# Patient Record
Sex: MALE | Race: BLACK OR AFRICAN AMERICAN | Employment: FULL TIME | URBAN - METROPOLITAN AREA
[De-identification: names, ages, dates, MRNs, and addresses within clinical notes are randomized per-mention and may not be internally consistent; named-entity substitution may affect disease eponyms.]

---

## 2017-07-09 ENCOUNTER — HOSPITAL ENCOUNTER (EMERGENCY)
Age: 22
Discharge: OTHER INSTITUTION | End: 2017-07-09
Attending: EMERGENCY MEDICINE
Payer: SELF-PAY

## 2017-07-09 ENCOUNTER — APPOINTMENT (OUTPATIENT)
Dept: CT IMAGING | Age: 22
End: 2017-07-09
Attending: EMERGENCY MEDICINE
Payer: SELF-PAY

## 2017-07-09 VITALS
HEART RATE: 66 BPM | SYSTOLIC BLOOD PRESSURE: 116 MMHG | WEIGHT: 160 LBS | DIASTOLIC BLOOD PRESSURE: 63 MMHG | OXYGEN SATURATION: 100 % | RESPIRATION RATE: 24 BRPM | BODY MASS INDEX: 22.96 KG/M2 | TEMPERATURE: 98.3 F

## 2017-07-09 DIAGNOSIS — S37.30XA URETHRAL INJURY, CLOSED, INITIAL ENCOUNTER: ICD-10-CM
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
## 2017-07-09 LAB
AMORPH CRY URNS QL MICRO: ABNORMAL
ANION GAP BLD CALC-SCNC: 3 MMOL/L (ref 3–18)
APPEARANCE UR: ABNORMAL
BACTERIA URNS QL MICRO: ABNORMAL /HPF
BASOPHILS # BLD AUTO: 0.1 K/UL (ref 0–0.06)
BASOPHILS # BLD: 1 % (ref 0–2)
BILIRUB UR QL: NEGATIVE
BUN SERPL-MCNC: 15 MG/DL (ref 7–18)
BUN/CREAT SERPL: 15 (ref 12–20)
CALCIUM SERPL-MCNC: 8.9 MG/DL (ref 8.5–10.1)
CHLORIDE SERPL-SCNC: 106 MMOL/L (ref 100–108)
CO2 SERPL-SCNC: 33 MMOL/L (ref 21–32)
COLOR UR: YELLOW
CREAT SERPL-MCNC: 1 MG/DL (ref 0.6–1.3)
DIFFERENTIAL METHOD BLD: ABNORMAL
EOSINOPHIL # BLD: 0.2 K/UL (ref 0–0.4)
EOSINOPHIL NFR BLD: 2 % (ref 0–5)
ERYTHROCYTE [DISTWIDTH] IN BLOOD BY AUTOMATED COUNT: 13.1 % (ref 11.6–14.5)
GLUCOSE SERPL-MCNC: 100 MG/DL (ref 74–99)
GLUCOSE UR STRIP.AUTO-MCNC: NEGATIVE MG/DL
HCT VFR BLD AUTO: 42.8 % (ref 36–48)
HGB BLD-MCNC: 14 G/DL (ref 13–16)
HGB UR QL STRIP: NEGATIVE
KETONES UR QL STRIP.AUTO: ABNORMAL MG/DL
LEUKOCYTE ESTERASE UR QL STRIP.AUTO: ABNORMAL
LYMPHOCYTES # BLD AUTO: 31 % (ref 21–52)
LYMPHOCYTES # BLD: 2.3 K/UL (ref 0.9–3.6)
MCH RBC QN AUTO: 28.1 PG (ref 24–34)
MCHC RBC AUTO-ENTMCNC: 32.7 G/DL (ref 31–37)
MCV RBC AUTO: 85.9 FL (ref 74–97)
MONOCYTES # BLD: 0.7 K/UL (ref 0.05–1.2)
MONOCYTES NFR BLD AUTO: 9 % (ref 3–10)
MUCOUS THREADS URNS QL MICRO: ABNORMAL /LPF
NEUTS SEG # BLD: 4.3 K/UL (ref 1.8–8)
NEUTS SEG NFR BLD AUTO: 57 % (ref 40–73)
NITRITE UR QL STRIP.AUTO: NEGATIVE
PH UR STRIP: 7 [PH] (ref 5–8)
PLATELET # BLD AUTO: 213 K/UL (ref 135–420)
PMV BLD AUTO: 10.9 FL (ref 9.2–11.8)
POTASSIUM SERPL-SCNC: 4.7 MMOL/L (ref 3.5–5.5)
PROT UR STRIP-MCNC: ABNORMAL MG/DL
RBC # BLD AUTO: 4.98 M/UL (ref 4.7–5.5)
RBC #/AREA URNS HPF: ABNORMAL /HPF (ref 0–5)
SODIUM SERPL-SCNC: 142 MMOL/L (ref 136–145)
SP GR UR REFRACTOMETRY: 1.03 (ref 1–1.03)
UROBILINOGEN UR QL STRIP.AUTO: 1 EU/DL (ref 0.2–1)
WBC # BLD AUTO: 7.5 K/UL (ref 4.6–13.2)
WBC URNS QL MICRO: ABNORMAL /HPF (ref 0–4)

## 2017-07-09 PROCEDURE — 80048 BASIC METABOLIC PNL TOTAL CA: CPT | Performed by: EMERGENCY MEDICINE

## 2017-07-09 PROCEDURE — 74011250636 HC RX REV CODE- 250/636: Performed by: EMERGENCY MEDICINE

## 2017-07-09 PROCEDURE — 74177 CT ABD & PELVIS W/CONTRAST: CPT

## 2017-07-09 PROCEDURE — 74011636320 HC RX REV CODE- 636/320: Performed by: EMERGENCY MEDICINE

## 2017-07-09 PROCEDURE — 81001 URINALYSIS AUTO W/SCOPE: CPT | Performed by: EMERGENCY MEDICINE

## 2017-07-09 PROCEDURE — 87491 CHLMYD TRACH DNA AMP PROBE: CPT | Performed by: EMERGENCY MEDICINE

## 2017-07-09 PROCEDURE — 96361 HYDRATE IV INFUSION ADD-ON: CPT

## 2017-07-09 PROCEDURE — 85025 COMPLETE CBC W/AUTO DIFF WBC: CPT | Performed by: EMERGENCY MEDICINE

## 2017-07-09 PROCEDURE — 99285 EMERGENCY DEPT VISIT HI MDM: CPT

## 2017-07-09 PROCEDURE — 96374 THER/PROPH/DIAG INJ IV PUSH: CPT

## 2017-07-09 RX ORDER — MORPHINE SULFATE 4 MG/ML
4 INJECTION, SOLUTION INTRAMUSCULAR; INTRAVENOUS
Status: COMPLETED | OUTPATIENT
Start: 2017-07-09 | End: 2017-07-09

## 2017-07-09 RX ADMIN — IOPAMIDOL 100 ML: 612 INJECTION, SOLUTION INTRAVENOUS at 01:28

## 2017-07-09 RX ADMIN — Medication 4 MG: at 00:55

## 2017-07-09 RX ADMIN — SODIUM CHLORIDE 1000 ML: 900 INJECTION, SOLUTION INTRAVENOUS at 00:55

## 2017-07-09 NOTE — ROUTINE PROCESS
TRANSFER - OUT REPORT:    Verbal report given to [id number] [place] [address] RN(name) on Radha Buckley  being transferred to [facility] Emergency Department (unit) for routine progression of care for rule out urethral injury. Report consisted of patients Situation, Background, Assessment and   Recommendations(SBAR). Includes pt hx/allergies/complaint/findings/plan of care. Information from the following report(s) ED Summary was reviewed with the receiving nurse. Lines:   Peripheral IV 07/09/17 Right Antecubital (Active)   Site Assessment Clean, dry, & intact 7/9/2017 12:38 AM   Phlebitis Assessment 0 7/9/2017 12:38 AM   Infiltration Assessment 0 7/9/2017 12:38 AM   Dressing Status Clean, dry, & intact 7/9/2017 12:38 AM   Dressing Type Tape;Transparent 7/9/2017 12:38 AM   Hub Color/Line Status Flushed;Patent 7/9/2017 12:38 AM   Action Taken Blood drawn 7/9/2017 12:38 AM        Opportunity for questions and clarification was provided.       Patient transported with:  [address] [facility] transport staff

## 2017-07-09 NOTE — LETTER
7/14/2017 2:05 PM 
 
Mr. Isaacs Pill [address] [address] Dear Mr. Manzano Miss: The results of your lab work performed in our office were abnormal and we have had difficulty reaching you by telephone. Please contact our office as soon as possible to discuss these results. Sincerely, SIRENA Ferreira
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
## 2017-07-09 NOTE — ED NOTES
Initial trauma assessment on arrival negative (DCAPBTLS negative); reports pain to LUQ/left flank/left posterior lower back. Pain described as sharp. ABD non distended/non tender except with discomfort to LUQ with palpation. Dr Lonnie Louis has assessed pt; orders placed.

## 2017-07-09 NOTE — ED NOTES
Pt asleep; rouses easily to voice/falls asleep easily. Calm/drowsy affect, respirations regular/non labored, skin warm/dry, with no distress noted. Mother instructed to have pt follow up with his PCP, to avoid sports/trauma/heavy lifting or other injury. Instructed to return for worsening pain/hematuria/other concerns.

## 2017-07-09 NOTE — ED NOTES
Verbal and bedside report given to Daria. Pt asleep; rouses easily to voice. Plan of care to transfer for further evaluation to [place] [id number] for urological evaluation explained. Pt affect calm, respirations regular/non labored, skin warm/dry with no bruising/swelling noted to LUQ/left flank/left lower lumbar pain. Per pt pain remains primarily to LUQ; minimal discomfort to general abdomen/left flank/left lower lumbar region. Pt able to void at this time 400 ml urine with no gross hematuria. Attempting to call report to [place] [address].

## 2017-07-09 NOTE — ED NOTES
Plan of care explained to pt and his mother, to include urological/urethral study at [facility]. Pt and mother resting comfortably; mother given recliner and warm blankets. Pt reports feeling improved and resting on his left side with no distress. Respirations regular/non labored with skin war/dry and no worsening pain issues.

## 2017-07-10 LAB
C TRACH RRNA SPEC QL NAA+PROBE: POSITIVE
N GONORRHOEA RRNA SPEC QL NAA+PROBE: NEGATIVE
SPECIMEN SOURCE: ABNORMAL

## 2018-03-20 NOTE — ED NOTES
+ Chlamydia result. Called and left message on number provided for patient to return call. Letter also sent. mother

## 2019-06-05 ENCOUNTER — APPOINTMENT (OUTPATIENT)
Dept: GENERAL RADIOLOGY | Age: 24
End: 2019-06-05
Attending: PHYSICIAN ASSISTANT
Payer: MEDICAID

## 2019-06-05 ENCOUNTER — HOSPITAL ENCOUNTER (EMERGENCY)
Age: 24
Discharge: HOME | End: 2019-06-05
Attending: EMERGENCY MEDICINE
Payer: MEDICAID

## 2019-06-05 VITALS
HEART RATE: 77 BPM | TEMPERATURE: 98.6 F | BODY MASS INDEX: 21.47 KG/M2 | RESPIRATION RATE: 18 BRPM | DIASTOLIC BLOOD PRESSURE: 78 MMHG | HEIGHT: 70 IN | OXYGEN SATURATION: 97 % | SYSTOLIC BLOOD PRESSURE: 118 MMHG | WEIGHT: 150 LBS

## 2019-06-05 DIAGNOSIS — R51.9 NONINTRACTABLE HEADACHE, UNSPECIFIED CHRONICITY PATTERN, UNSPECIFIED HEADACHE TYPE: Primary | ICD-10-CM

## 2019-06-05 PROCEDURE — 99282 EMERGENCY DEPT VISIT SF MDM: CPT

## 2019-06-05 PROCEDURE — 71046 X-RAY EXAM CHEST 2 VIEWS: CPT

## 2019-06-05 PROCEDURE — 74011250637 HC RX REV CODE- 250/637: Performed by: PHYSICIAN ASSISTANT

## 2019-06-05 RX ORDER — ACETAMINOPHEN 325 MG/1
650 TABLET ORAL
Qty: 20 TAB | Refills: 0 | Status: SHIPPED | OUTPATIENT
Start: 2019-06-05 | End: 2019-07-17

## 2019-06-05 RX ORDER — IBUPROFEN 600 MG/1
600 TABLET ORAL
Qty: 20 TAB | Refills: 0 | Status: SHIPPED | OUTPATIENT
Start: 2019-06-05 | End: 2019-07-17

## 2019-06-05 RX ORDER — ACETAMINOPHEN 500 MG
1000 TABLET ORAL
Status: COMPLETED | OUTPATIENT
Start: 2019-06-05 | End: 2019-06-05

## 2019-06-05 RX ADMIN — ACETAMINOPHEN 1000 MG: 500 TABLET ORAL at 11:14

## 2019-06-05 NOTE — DISCHARGE INSTRUCTIONS

## 2019-06-05 NOTE — ED TRIAGE NOTES
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 He is wondering if it's from inhaling smoke. He states headache stays at a constant 4/10 but he has not taken any medication because he does not want to be drowsy.

## 2019-06-05 NOTE — ED NOTES
Frances Bloom is a 27 y.o. male that was discharged in stable condition. The patients diagnosis, condition and treatment were explained to  patient and aftercare instructions were given. The patient verbalized understanding. Patient armband removed and shredded.

## 2019-06-05 NOTE — ED PROVIDER NOTES
EMERGENCY DEPARTMENT HISTORY AND PHYSICAL EXAM    Date: 6/5/2019  Patient Name: Chay Reyes    History of Presenting Illness     Chief Complaint   Patient presents with    Headache         History Provided By: Patient    Chief Complaint: Headache and concern for  overall health of his lungs  Duration: 2 months  Timing: Intermittent  Location: Diffuse head  Quality: Aching  Severity: 4 out of 10  Modifying Factors: Worse when working and not using his protective equipment  Associated Symptoms: none       Additional History (Context): Chay Reyes is a 27 y.o. male with a history of asthma who presents today for 2-week history of headaches and concern for his lung health. Patient states he recently started a job at the [id number] and he states he is exposed to multiple things while there. Patient states he does not like to wear his goggles or his mask is a make him feel claustrophobic. Patient reports he has not tried anything for this at home for fear Tylenol would make him sleepy and he would be unable to do his job. Patient has not been seen by primary care doctor. Patient does not have a history of headaches. PCP: Candido Valles MD    Current Outpatient Medications   Medication Sig Dispense Refill    ibuprofen (MOTRIN) 600 mg tablet Take 1 Tab by mouth every six (6) hours as needed for Pain. 20 Tab 0    acetaminophen (TYLENOL) 325 mg tablet Take 2 Tabs by mouth every four (4) hours as needed for Pain.  20 Tab 0       Past History     Past Medical History:  Past Medical History:   Diagnosis Date    Asthma        Past Surgical History:  Past Surgical History:   Procedure Laterality Date    LAP,INGUINAL HERNIA REPR,INITIAL         Family History:  Family History   Problem Relation Age of Onset    Diabetes Mother     Hypertension Mother     Asthma Mother     Diabetes Maternal Grandmother     Hypertension Maternal Grandmother     Hypertension Maternal Grandfather        Social History:  Social History     Tobacco Use    Smoking status: Never Smoker    Smokeless tobacco: Never Used   Substance Use Topics    Alcohol use: No    Drug use: No       Allergies:  No Known Allergies      Review of Systems   Review of Systems   Constitutional: Negative for chills and fever. HENT: Negative for congestion, rhinorrhea and sore throat. Respiratory: Negative for cough and shortness of breath. Cardiovascular: Negative for chest pain. Gastrointestinal: Negative for abdominal pain, blood in stool, constipation, diarrhea, nausea and vomiting. Genitourinary: Negative for dysuria, frequency and hematuria. Musculoskeletal: Negative for back pain and myalgias. Skin: Negative for rash and wound. Neurological: Positive for headaches. Negative for dizziness. All other systems reviewed and are negative. All Other Systems Negative  Physical Exam     Vitals:    06/05/19 1055   BP: 118/78   Pulse: 77   Resp: 18   Temp: 98.6 °F (37 °C)   SpO2: 97%   Weight: 68 kg (150 lb)   Height: 5' 10\" (1.778 m)     Physical Exam   Constitutional: He is oriented to person, place, and time. He appears well-developed and well-nourished. No distress. Well-appearing, no distress   HENT:   Head: Normocephalic and atraumatic. Eyes: Pupils are equal, round, and reactive to light. Conjunctivae and EOM are normal.   Neck: Normal range of motion. Neck supple. Cardiovascular: Normal rate, regular rhythm and normal heart sounds. Pulmonary/Chest: Effort normal and breath sounds normal. No respiratory distress. He has no decreased breath sounds. He has no wheezes. He has no rhonchi. He has no rales. He exhibits no tenderness. Clear lung sounds equal bilaterally, speaking in full sentences, no wheezing   Abdominal: Soft. Bowel sounds are normal. He exhibits no distension. There is no tenderness. There is no rebound and no guarding. Musculoskeletal: Normal range of motion. He exhibits no edema or deformity. Neurological: He is alert and oriented to person, place, and time. No cranial nerve deficit or sensory deficit. Coordination and gait normal. GCS eye subscore is 4. GCS verbal subscore is 5. GCS motor subscore is 6. Skin: Skin is warm and dry. He is not diaphoretic. Psychiatric: He has a normal mood and affect. Nursing note and vitals reviewed. Diagnostic Study Results     Labs -   No results found for this or any previous visit (from the past 12 hour(s)). Radiologic Studies -   XR CHEST PA LAT    (Results Pending)     CT Results  (Last 48 hours)    None        CXR Results  (Last 48 hours)    None            Medical Decision Making   I am the first provider for this patient. I reviewed the vital signs, available nursing notes, past medical history, past surgical history, family history and social history. Vital Signs-Reviewed the patient's vital signs. Records Reviewed: Nursing Notes and Old Medical Records     Procedures: None   Procedures    Provider Notes (Medical Decision Making):   Differential: Cluster headache, tension headache, migraine, environmental exposure, asthma exacerbation    Plan: We will order chest x-ray given patient's severe concern for his lung health. Have stressed the reassuring lung findings on exam.  Will give dose of Tylenol. 11:59 AM  Have shared reassuring chest x-ray with  Patient. Have advised patient to take Tylenol Motrin as needed for headaches. Have stressed the importance of wearing eye protection and mask while at work. Patient agrees and states understanding of importance. Have strongly encourage patient to follow-up with primary care doctor. Patient agrees with the plan and management and states all questions have been thoroughly answered and there are no more remaining questions. MED RECONCILIATION:  No current facility-administered medications for this encounter.       Current Outpatient Medications   Medication Sig    ibuprofen (MOTRIN) 600 mg tablet Take 1 Tab by mouth every six (6) hours as needed for Pain.  acetaminophen (TYLENOL) 325 mg tablet Take 2 Tabs by mouth every four (4) hours as needed for Pain. Disposition:  Home     DISCHARGE NOTE:   Pt has been reexamined. Patient has no new complaints, changes, or physical findings. Care plan outlined and precautions discussed. Results of workup were reviewed with the patient. All medications were reviewed with the patient. All of pt's questions and concerns were addressed. Patient was instructed and agrees to follow up with PCP  as well as to return to the ED upon further deterioration. Patient is ready to go home. Follow-up Information     Follow up With Specialties Details Why [place] [address] EMERGENCY DEPT Emergency Medicine  As needed [address] [address]    Sabra Prader, MD Internal Medicine In 2 days  [address] [place] [address]  [phone]            Current Discharge Medication List      START taking these medications    Details   ibuprofen (MOTRIN) 600 mg tablet Take 1 Tab by mouth every six (6) hours as needed for Pain. Qty: 20 Tab, Refills: 0      acetaminophen (TYLENOL) 325 mg tablet Take 2 Tabs by mouth every four (4) hours as needed for Pain. Qty: 20 Tab, Refills: 0                 Diagnosis     Clinical Impression:   1.  Nonintractable headache, unspecified chronicity pattern, unspecified headache type

## 2019-06-05 NOTE — LETTER
[facility] EMERGENCY DEPT 
[address] [facility] [address] [phone] Work/School Note Date: 6/5/2019 To Whom It May concern: 
 
Binta Tijerina was seen and treated today in the emergency room by the following provider(s): 
Attending Provider: Gavino Alberts DO Physician Assistant: SIRENA Dorsey. Binta Tijerina may return to work on 6/6/19 Sincerely, Josep Agrawal

## 2019-07-06 ENCOUNTER — HOSPITAL ENCOUNTER (EMERGENCY)
Age: 24
Discharge: HOME | End: 2019-07-06
Attending: EMERGENCY MEDICINE | Admitting: EMERGENCY MEDICINE
Payer: MEDICAID

## 2019-07-06 VITALS
RESPIRATION RATE: 20 BRPM | HEART RATE: 88 BPM | DIASTOLIC BLOOD PRESSURE: 73 MMHG | SYSTOLIC BLOOD PRESSURE: 118 MMHG | TEMPERATURE: 98 F | OXYGEN SATURATION: 99 %
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
## 2019-07-06 PROCEDURE — 99282 EMERGENCY DEPT VISIT SF MDM: CPT

## 2019-07-06 RX ORDER — CYCLOBENZAPRINE HCL 10 MG
TABLET ORAL
COMMUNITY
End: 2019-07-17

## 2019-07-06 NOTE — LETTER
[address] [place] [address] Dr [facility] EMERGENCY DEPT 
[address] [facility] [address] [phone] Work/School Note Date: 7/6/2019 To Whom It May concern: 
 
Elle Wilson was seen and treated today in the emergency room by the following provider(s): 
Attending Provider: Bo Bloom MD.   
 
Elle Wilson may return to work on today.  
 
Sincerely, 
 
 
 
 
Margie Davison MD
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
## 2019-07-17 ENCOUNTER — APPOINTMENT (OUTPATIENT)
Dept: GENERAL RADIOLOGY | Age: 24
End: 2019-07-17
Attending: PHYSICIAN ASSISTANT
Payer: MEDICAID

## 2019-07-17 ENCOUNTER — HOSPITAL ENCOUNTER (EMERGENCY)
Age: 24
Discharge: HOME | End: 2019-07-17
Attending: EMERGENCY MEDICINE | Admitting: EMERGENCY MEDICINE
Payer: MEDICAID

## 2019-07-17 VITALS
OXYGEN SATURATION: 98 % | TEMPERATURE: 98.8 F | HEART RATE: 84 BPM | HEIGHT: 69 IN | WEIGHT: 150 LBS | SYSTOLIC BLOOD PRESSURE: 118 MMHG | RESPIRATION RATE: 18 BRPM | BODY MASS INDEX: 22.22 KG/M2 | DIASTOLIC BLOOD PRESSURE: 77 MMHG

## 2019-07-17 DIAGNOSIS — S60.121A SUBUNGUAL HEMATOMA OF RIGHT INDEX FINGER: Primary | ICD-10-CM

## 2019-07-17 PROCEDURE — 77030008323 HC SPLNT FNGR GTR DJOR -A

## 2019-07-17 PROCEDURE — 74011250637 HC RX REV CODE- 250/637: Performed by: PHYSICIAN ASSISTANT

## 2019-07-17 PROCEDURE — 73140 X-RAY EXAM OF FINGER(S): CPT

## 2019-07-17 PROCEDURE — 99282 EMERGENCY DEPT VISIT SF MDM: CPT

## 2019-07-17 RX ORDER — HYDROCODONE BITARTRATE AND ACETAMINOPHEN 5; 325 MG/1; MG/1
1 TABLET ORAL
Status: COMPLETED | OUTPATIENT
Start: 2019-07-17 | End: 2019-07-17

## 2019-07-17 RX ORDER — HYDROCODONE BITARTRATE AND ACETAMINOPHEN 5; 325 MG/1; MG/1
1 TABLET ORAL
Qty: 6 TAB | Refills: 0 | Status: SHIPPED | OUTPATIENT
Start: 2019-07-17 | End: 2019-07-20

## 2019-07-17 RX ADMIN — HYDROCODONE BITARTRATE AND ACETAMINOPHEN 1 TABLET: 5; 325 TABLET ORAL at 17:58
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
## 2019-07-17 NOTE — ED PROVIDER NOTES
EMERGENCY DEPARTMENT HISTORY AND PHYSICAL EXAM    Date: 7/17/2019  Patient Name: Doris Mejía    History of Presenting Illness     Chief Complaint   Patient presents with    Finger Pain         History Provided By: Patient    Chief Complaint: right finger pain  Duration: 1 Days  Timing:  Acute  Location: right 2nd finger  Quality: Aching  Severity: 7 out of 10  Modifying Factors: movement worsens pain  Associated Symptoms: swelling, bleeding      Additional History (Context): Doris Mejía is a 27 y.o. male with No significant past medical history who presents with right 2nd finger pain which occurred at 2pm yesterday. He slammed finger in car door. Took 600mg ibuprofen w/o relief. Right hand dominant. Could not go to work today. PCP: Josiah Bonilla MD        Past History     Past Medical History:  Past Medical History:   Diagnosis Date    Asthma        Past Surgical History:  Past Surgical History:   Procedure Laterality Date    LAP,INGUINAL HERNIA REPR,INITIAL         Family History:  Family History   Problem Relation Age of Onset    Diabetes Mother     Hypertension Mother     Asthma Mother     Diabetes Maternal Grandmother     Hypertension Maternal Grandmother     Hypertension Maternal Grandfather        Social History:  Social History     Tobacco Use    Smoking status: Never Smoker    Smokeless tobacco: Never Used   Substance Use Topics    Alcohol use: No    Drug use: No       Allergies:  No Known Allergies      Review of Systems   Review of Systems   Constitutional: Negative for chills and fever. Musculoskeletal: Positive for arthralgias and joint swelling. Skin: Positive for wound. All other systems reviewed and are negative.     All Other Systems Negative  Physical Exam     Vitals:    07/17/19 1644   BP: 118/77   Pulse: 84   Resp: 18   Temp: 98.8 °F (37.1 °C)   SpO2: 98%   Weight: 68 kg (150 lb)   Height: 5' 9\" (1.753 m)     Physical Exam   Constitutional: He appears well-developed and well-nourished. No distress. HENT:   Head: Normocephalic and atraumatic. Eyes: Conjunctivae are normal.   Neck: Normal range of motion. Neck supple. Musculoskeletal:        Right hand: Normal sensation noted. Hands:  Skin: Skin is warm and dry. He is not diaphoretic. Psychiatric: He has a normal mood and affect. Diagnostic Study Results     Labs -   No results found for this or any previous visit (from the past 12 hour(s)). Radiologic Studies -   XR 2ND FINGER RT MIN 2 V    (Results Pending)     CT Results  (Last 48 hours)    None        CXR Results  (Last 48 hours)    None            Medical Decision Making   I am the first provider for this patient. I reviewed the vital signs, available nursing notes, past medical history, past surgical history, family history and social history. Vital Signs-Reviewed the patient's vital signs. Pulse Oximetry Analysis - 98% on RA      Records Reviewed: Nursing Notes    Procedures:  Procedures    Provider Notes (Medical Decision Making):     Ddx: fx, contusion, subungual hematoma    Pt with blunt injury. SH too small to drain. No fx on xray. Will dress and d/c with pain meds. SIRENA Sharif 6:09 PM        MED RECONCILIATION:  No current facility-administered medications for this encounter. No current outpatient medications on file. Disposition:  home    DISCHARGE NOTE:     Pt has been reexamined. Patient has no new complaints, changes, or physical findings. Care plan outlined and precautions discussed. Results of xray were reviewed with the patient. All medications were reviewed with the patient; will d/c home with pain meds. All of pt's questions and concerns were addressed. Patient was instructed and agrees to follow up with pcp, as well as to return to the ED upon further deterioration. Patient is ready to go home.     Follow-up Information    None         There are no discharge medications for this patient. Diagnosis     Clinical Impression:   1.  Subungual hematoma of right index finger

## 2019-07-17 NOTE — LETTER
NOTIFICATION RETURN TO WORK / SCHOOL 
 
7/17/2019 6:42 PM 
 
Mr. Daron Erickson [address] [place] [id number] To Whom It May Concern: 
 
Daron Erickson is currently under the care of [id number] [place] [address] EMERGENCY DEPT. He will return to work/school on: 07/22/2019. If there are questions or concerns please have the patient contact our office.  
 
 
 
Sincerely, 
 
 
Selvin Manning RN [facility]

## 2019-10-07 NOTE — ED NOTES
Chief Complaint   Patient presents with    Back Pain     pt c/o sciatic pain and leg numbness also pt wants a flu shot         Patient ID: Lizet Carrillo  is a 50 y o  male  HPI  Pt is seeing for L sided low back pain with radiation to L leg and L groin  -  Started 2 wks ago -  No injury reported -  Tried Advil with no help, started to see chiropractor  -  No similar symptoms in the past     The following portions of the patient's history were reviewed and updated as appropriate: allergies, current medications, past family history, past medical history, past social history, past surgical history and problem list     Review of Systems   Gastrointestinal: Negative  Genitourinary: Negative  Neurological: Negative  All other systems reviewed and are negative  Current Outpatient Medications   Medication Sig Dispense Refill    albuterol (PROAIR HFA) 90 mcg/act inhaler Inhale 2 puffs every 4 (four) hours as needed for wheezing 3 Inhaler 3    amLODIPine (NORVASC) 5 mg tablet Take 1 tablet (5 mg total) by mouth daily 90 tablet 3    hydrochlorothiazide (HYDRODIURIL) 12 5 mg tablet Take 1 tablet (12 5 mg total) by mouth daily 90 tablet 3    losartan (COZAAR) 100 MG tablet Take 1 tablet (100 mg total) by mouth daily 90 tablet 3    Mometasone Furo-Formoterol Fum (DULERA) 200-5 MCG/ACT AERO Inhale 2 puffs 2 (two) times a day 3 Inhaler 3    neomycin-polymyxin-hydrocortisone (CORTISPORIN) otic solution Administer 4 drops into both ears every 6 (six) hours For 5 days 10 mL 0     No current facility-administered medications for this visit  Objective:    /70 (BP Location: Left arm, Patient Position: Sitting, Cuff Size: Large)   Pulse 64   Temp 98 5 °F (36 9 °C)   Resp 18   Ht 6' 5" (1 956 m)   Wt (!) 141 kg (310 lb 3 2 oz)   BMI 36 78 kg/m²        Physical Exam   Constitutional: He is oriented to person, place, and time  No distress  Musculoskeletal: He exhibits no edema          Lumbar Report given to EMS ([facility]). Pt affect calm, respirations regular/non labored. Denies any pain with urination. No distress noted. back: He exhibits decreased range of motion, tenderness, pain and spasm  He exhibits no bony tenderness and no swelling  Neurological: He is alert and oriented to person, place, and time  No cranial nerve deficit  Skin: No rash noted  No pallor  Assessment/Plan:         Diagnoses and all orders for this visit:    Sciatica of left side  -     methylPREDNISolone 4 MG tablet therapy pack; Use as directed on package  -     cyclobenzaprine (FLEXERIL) 10 mg tablet; Take 1 tablet (10 mg total) by mouth daily at bedtime  -     acetaminophen-codeine (TYLENOL with CODEINE #3) 300-30 MG per tablet; Take 1 tablet by mouth every 4 (four) hours as needed for moderate pain  -     Ambulatory referral to Physical Therapy; Future    Need for influenza vaccination  -     influenza vaccine, 6321-1554, quadrivalent, recombinant, PF, 0 5 mL, for patients 18 yr+ (FLUBLOK)            BMI Counseling: Body mass index is 36 78 kg/m²  Discussed the patient's BMI with him  The BMI is above normal  Nutrition recommendations include reducing portion sizes, decreasing overall calorie intake, 3-5 servings of fruits/vegetables daily, reducing fast food intake, consuming healthier snacks, decreasing soda and/or juice intake and moderation in carbohydrate intake  Exercise recommendations include exercising 3-5 times per week         rto prn           Lizzeth Escobar MD

## 2020-02-14 ENCOUNTER — HOSPITAL ENCOUNTER (EMERGENCY)
Age: 25
Discharge: HOME | End: 2020-02-14
Attending: EMERGENCY MEDICINE
Payer: SELF-PAY

## 2020-02-14 VITALS
HEART RATE: 65 BPM | TEMPERATURE: 98 F | RESPIRATION RATE: 18 BRPM | DIASTOLIC BLOOD PRESSURE: 70 MMHG | BODY MASS INDEX: 22.9 KG/M2 | WEIGHT: 160 LBS | SYSTOLIC BLOOD PRESSURE: 130 MMHG | HEIGHT: 70 IN

## 2020-02-14 DIAGNOSIS — L21.0 PITYRIASIS: Primary | ICD-10-CM

## 2020-02-14 PROCEDURE — 99282 EMERGENCY DEPT VISIT SF MDM: CPT

## 2020-02-14 RX ORDER — PREDNISONE 20 MG/1
20 TABLET ORAL DAILY
Qty: 5 TAB | Refills: 0 | Status: SHIPPED | OUTPATIENT
Start: 2020-02-14 | End: 2020-02-19

## 2020-02-14 NOTE — LETTER
[facility] EMERGENCY DEPT 
[address] [address] 
[phone] Work/School Note Date: 2/14/2020 To Whom It May concern: 
 
Nolan Sprain was seen and treated today in the emergency room by the following provider(s): 
Attending Provider: Rafael Dumont MD.   
 
Nolan Sprain may return to work on February 14, 2020. Patient is not contagious.  
 
Sincerely, 
 
 
 
 
Petra Ferreira MD

## 2020-02-14 NOTE — ED PROVIDER NOTES
HPI   Patient complains of a several day history of a nonpruritic rash on her trunk and arms. He does not recall exactly when the rash started but says he noticed it several days ago. He said he was sent home from work today for evaluation of the same denies any itching fever chills or other systemic symptoms. He says he has not had these before.   Past Medical History:   Diagnosis Date    Asthma        Past Surgical History:   Procedure Laterality Date    LAP,INGUINAL HERNIA REPR,INITIAL           Family History:   Problem Relation Age of Onset    Diabetes Mother     Hypertension Mother     Asthma Mother     Diabetes Maternal Grandmother     Hypertension Maternal Grandmother     Hypertension Maternal Grandfather        Social History     Socioeconomic History    Marital status: SINGLE     Spouse name: Not on file    Number of children: Not on file    Years of education: Not on file    Highest education level: Not on file   Occupational History    Not on file   Social Needs    Financial resource strain: Not on file    Food insecurity:     Worry: Not on file     Inability: Not on file    Transportation needs:     Medical: Not on file     Non-medical: Not on file   Tobacco Use    Smoking status: Never Smoker    Smokeless tobacco: Never Used   Substance and Sexual Activity    Alcohol use: No    Drug use: No    Sexual activity: Yes     Partners: Female   Lifestyle    Physical activity:     Days per week: Not on file     Minutes per session: Not on file    Stress: Not on file   Relationships    Social connections:     Talks on phone: Not on file     Gets together: Not on file     Attends Gnosticism service: Not on file     Active member of club or organization: Not on file     Attends meetings of clubs or organizations: Not on file     Relationship status: Not on file    Intimate partner violence:     Fear of current or ex partner: Not on file     Emotionally abused: Not on file     Physically abused: Not on file     Forced sexual activity: Not on file   Other Topics Concern    Not on file   Social History Narrative    Not on file         ALLERGIES: Patient has no known allergies. Review of Systems   Constitutional: Negative. HENT: Negative. Eyes: Negative. Respiratory: Negative. Cardiovascular: Negative. Gastrointestinal: Negative. Genitourinary: Negative. Musculoskeletal: Negative. Skin: Positive for rash. Neurological: Negative. Psychiatric/Behavioral: Negative. Vitals:    02/14/20 0817   BP: 130/70   Pulse: 65   Resp: 18   Temp: 98 °F (36.7 °C)   Weight: 72.6 kg (160 lb)   Height: 5' 10\" (1.778 m)            Physical Exam  Vitals signs and nursing note reviewed. Constitutional:       Appearance: He is well-developed. HENT:      Head: Normocephalic and atraumatic. Eyes:      Pupils: Pupils are equal, round, and reactive to light. Neck:      Musculoskeletal: Neck supple. Cardiovascular:      Rate and Rhythm: Normal rate and regular rhythm. Pulmonary:      Effort: Pulmonary effort is normal.      Breath sounds: Normal breath sounds. Abdominal:      Palpations: Abdomen is soft. Musculoskeletal: Normal range of motion. Skin:     General: Skin is warm and dry. Findings: Rash present. Comments: Patient has a diffuse maculopapular rash on his trunk with lesser sites on his arms. The macules have a scaly flaky type surface. There is somewhat of a [place] tree pattern arrangement on his back. Neurological:      Mental Status: He is alert and oriented to person, place, and time. MDM       Procedures      Patient understands and agrees with the disposition and follow-up plan.   Reena Dupont MD 9:07 AM

## 2020-02-14 NOTE — LETTER
[address] [place] [address] Dr [facility] EMERGENCY DEPT 
[address] [facility] [address] [phone] Work/School Note Date: 2/14/2020 To Whom It May concern: 
 
Thomas Abdi was seen and treated today in the emergency room by the following provider(s): 
No providers found. Thomas Abdi may return to work on 02/15/2020. Patient is not contagious.  
Sincerely, 
 
 
 
 
Byron Cloud RN

## 2020-02-14 NOTE — DISCHARGE INSTRUCTIONS
Patient Education        Seborrheic Dermatitis: Care Instructions  Your Care Instructions  Seborrheic dermatitis (say \"jrv-gkf-BIY-ick pfl-dox-IQ-tus\") is a skin problem that causes a reddish rash with greasy, flaky, yellow skin patches. The rash may appear on many parts of the body. It may be on the scalp, face (especially the eyebrow area and between the nose and mouth), ears, breasts, underarms, and genital area. The flaky skin on the scalp is called dandruff. This rash is often a long-term (chronic) condition. It may last for years. But the symptoms may come and go. Symptoms can be treated with special creams, shampoos, or other skin care. The cause of seborrheic dermatitis is not fully understood. It may occur when skin glands make too much oil. It may get worse in cold weather or with stress. A type of skin fungus, or yeast, may also be linked with this condition. Follow-up care is a key part of your treatment and safety. Be sure to make and go to all appointments, and call your doctor if you are having problems. It's also a good idea to know your test results and keep a list of the medicines you take. How can you care for yourself at home? · If your doctor prescribes a steroid cream, dandruff shampoo, or antifungal cream or medicine, use it as directed. If your doctor prescribes other medicine, take it as directed. · Use a dandruff shampoo if seborrheic dermatitis affects your scalp. This includes Head & Shoulders, Sebulex, and Selsun Blue. You may need to try a few kinds of shampoo to find the one that works best for you. · To help with itching:  ? Use hydrocortisone cream. Follow the directions on the label. ? Use cold, wet cloths. ? Take an over-the-counter antihistamine, such as diphenhydramine (Benadryl) or loratadine (Claritin). Read and follow all instructions on the label. When should you call for help?   Call your doctor now or seek immediate medical care if:    · You have signs of infection, such as:  ? Increased pain, swelling, warmth, or redness. ? Red streaks leading from the rash. ? Pus draining from the rash. ? A fever.    Watch closely for changes in your health, and be sure to contact your doctor if:    · The rash gets worse or spreads to other parts of your body.     · You do not get better as expected. Where can you learn more? Go to http://marjan-nya.info/. Enter Z221 in the search box to learn more about \"Seborrheic Dermatitis: Care Instructions. \"  Current as of: April 1, 2019  Content Version: 12.2  © 4656-0548 [employer]. Care instructions adapted under license by [employer] (which disclaims liability or warranty for this information). If you have questions about a medical condition or this instruction, always ask your healthcare professional. [address] any warranty or liability for your use of this information.

## 2022-01-31 ENCOUNTER — HOSPITAL ENCOUNTER (EMERGENCY)
Age: 27
Discharge: HOME | End: 2022-01-31
Attending: EMERGENCY MEDICINE

## 2022-01-31 VITALS
HEIGHT: 70 IN | HEART RATE: 65 BPM | BODY MASS INDEX: 22.9 KG/M2 | SYSTOLIC BLOOD PRESSURE: 121 MMHG | TEMPERATURE: 98.3 F | RESPIRATION RATE: 16 BRPM | DIASTOLIC BLOOD PRESSURE: 78 MMHG | OXYGEN SATURATION: 100 % | WEIGHT: 160 LBS

## 2022-01-31 DIAGNOSIS — S01.81XA FACIAL LACERATION, INITIAL ENCOUNTER: Primary | ICD-10-CM

## 2022-01-31 PROCEDURE — 75810000293 HC SIMP/SUPERF WND  RPR

## 2022-01-31 PROCEDURE — 74011000250 HC RX REV CODE- 250

## 2022-01-31 PROCEDURE — 99282 EMERGENCY DEPT VISIT SF MDM: CPT

## 2022-01-31 PROCEDURE — 74011250637 HC RX REV CODE- 250/637

## 2022-01-31 RX ORDER — ACETAMINOPHEN 500 MG
1000 TABLET ORAL
Status: COMPLETED | OUTPATIENT
Start: 2022-01-31 | End: 2022-01-31

## 2022-01-31 RX ADMIN — Medication 2 ML: at 13:37

## 2022-01-31 RX ADMIN — ACETAMINOPHEN 1000 MG: 500 TABLET ORAL at 13:37

## 2022-01-31 NOTE — ED TRIAGE NOTES
Patient states being struck by the forklift at work today. He presents with small laceration across forehead. Bleeding controlled on arrival to triage. He states having headache. Unsure of last tetanus immunization.

## 2022-01-31 NOTE — Clinical Note
[address] [facility] EMERGENCY DEPT  [address] [id number] [phone]    Work/School Note    Date: 1/31/2022    To Whom It May concern:    Delores Cuadra was seen and treated today in the emergency room by the following provider(s):  Attending Provider: Mitra Henry MD  Nurse Practitioner: Venkatesh Durbin NP. Delores Cuadra is excused from work/school on 01/31/22 and 02/01/22. He is medically clear to return to work/school on 2/2/2022.        Sincerely,          Brittany June NP

## 2022-01-31 NOTE — DISCHARGE INSTRUCTIONS
Follow up with PCP. Return to ED for any new or worsening symptoms to include: You have new pain, or your pain gets worse, you cut starts to bleed, or you have symptoms of infection, such as: Increased pain, swelling, warmth, or redness around the cut. Red streaks leading from the cut. Pus draining from the cut. A fever.

## 2022-01-31 NOTE — ED PROVIDER NOTES
EMERGENCY DEPARTMENT HISTORY AND PHYSICAL EXAM    12:53 PM      Date: 1/31/2022  Patient Name: Sea Hernandez    History of Presenting Illness     Chief Complaint   Patient presents with    Head Injury         History Provided By: Patient    Additional History (Context): Sea Hernandez is a 27 y.o. male with No significant past medical history who presents with laceration to forehead that occurred this morning. Patient reports he was struck in the forehead with a forklift while at work. States his forehead aches where the laceration is, took Advil prior to arrival with no relief. Denies LOC, nausea, vomiting, or vision changes. Patient unsure of last tetanus. PCP: Roxie Mendoza MD        Past History     Past Medical History:  Past Medical History:   Diagnosis Date    Asthma        Past Surgical History:  Past Surgical History:   Procedure Laterality Date    [place] LAP,INGUINAL HERNIA REPR,INITIAL         Family History:  Family History   Problem Relation Age of Onset    Diabetes Mother     Hypertension Mother     Asthma Mother     Diabetes Maternal Grandmother     Hypertension Maternal Grandmother     Hypertension Maternal Grandfather        Social History:  Social History     Tobacco Use    Smoking status: Never Smoker    Smokeless tobacco: Never Used   Substance Use Topics    Alcohol use: No    Drug use: No       Allergies:  No Known Allergies      Review of Systems       Review of Systems   Constitutional: Negative for chills and fever. Respiratory: Negative for shortness of breath. Cardiovascular: Negative for chest pain. Gastrointestinal: Negative for abdominal pain, nausea and vomiting. Skin: Positive for wound (Forehead laceration). Negative for rash. Neurological: Positive for headaches. Negative for dizziness and weakness. All other systems reviewed and are negative. Physical Exam     Visit Vitals  /78 (BP 1 Location: Left upper arm, BP Patient Position:  At rest)   Pulse 65   Temp 98.3 °F (36.8 °C)   Resp 16   Ht 5' 10\" (1.778 m)   Wt 72.6 kg (160 lb)   SpO2 100%   BMI 22.96 kg/m²         Physical Exam  Vitals and nursing note reviewed. Constitutional:       General: He is not in acute distress. Appearance: He is well-developed. He is not ill-appearing, toxic-appearing or diaphoretic. HENT:      Head: Normocephalic and atraumatic. Eyes:      General: Lids are normal. No visual field deficit. Extraocular Movements:      Right eye: Normal extraocular motion and no nystagmus. Left eye: Normal extraocular motion and no nystagmus. Cardiovascular:      Rate and Rhythm: Normal rate and regular rhythm. Heart sounds: Normal heart sounds. No murmur heard. No friction rub. No gallop. Pulmonary:      Effort: Pulmonary effort is normal. No respiratory distress. Breath sounds: Normal breath sounds. No wheezing or rales. Musculoskeletal:         General: Normal range of motion. Cervical back: Normal range of motion and neck supple. Skin:     General: Skin is warm. Findings: Laceration present. No rash. Comments: 1 cm linear superficial laceration in upper forehead area. No bleeding, or discharge present. Neurological:      General: No focal deficit present. Mental Status: He is alert and oriented to person, place, and time. Motor: No weakness. Coordination: Finger-Nose-Finger Test normal.      Gait: Gait normal.      Comments: Patient speech normal.           Diagnostic Study Results     Labs -  No results found for this or any previous visit (from the past 12 hour(s)). Radiologic Studies -   No orders to display         Medical Decision Making   I am the first provider for this patient. I reviewed the vital signs, available nursing notes, past medical history, past surgical history, family history and social history. Vital Signs-Reviewed the patient's vital signs.     Pulse Oximetry Analysis -  100% on room air (Interpretation)      Records Reviewed: Nursing Notes and Old Medical Records (Time of Review: 12:53 PM)    ED Course: Progress Notes, Reevaluation, and Consults:  2:20 PM-Patient states he no longer has a headache. Reports the laceration area is numb. Dermabond and 1 steri strip applied, patient tolerated well without difficulty. 2:36 PM-Patient has been re-examined. Patient has no new complaints, changes, or physical findings. Care plan outlined and precautions discussed. All of patient's questions and concerns were addressed. Patient was instructed and agrees to follow up with PCP. Strict return to ED precautions provided. Patient explained and given discharge paperwork. Patient verbalized understanding. Ready for discharge. 5:40 PM- Call placed to patient to discuss returning for tetanus immunization. No answer, voicemail left for him to call [facility] ED. Patient can follow up with PCP also. Provider Notes (Medical Decision Making):     Patient is a 27 y.o. male that presents to ED for 1 cm superficial forehead laceration that occurred today at work. Patient afebrile, non-toxic appearing, and normal vital signs. Patient alert and oriented X3. Complains of head aching at the laceration site that was relieved with Tylenol and LET. Do not suspect concusion d/t no confusion, lack of coordination, memory loss, nausea, vomiting, dizziness, or excessive fatigue. Neuro exam intact. Patient without facial swelling, crepitus or periorbital ridge step-off. Do not suspect facial fracture. Laceration closed with steri-strip and derma bond. Patient stable for discharge. Procedures:     Wound Closure by Adhesive    Date/Time: 1/31/2022 2:20 PM  Performed by: Keo Ashton NP  Authorized by: Keo Ashton NP     Consent:     Consent obtained:  Verbal    Consent given by:  Patient    Risks discussed:  Infection, poor cosmetic result and pain  Anesthesia (see MAR for exact dosages):      Anesthesia method: Topical application    Topical anesthetic:  LET  Laceration details:     Location:  Face    Face location:  Forehead    Length (cm):  1  Repair type:     Repair type:  Simple  Treatment:     Area cleansed with:  Saline    Amount of cleaning:  Standard    Irrigation solution:  Sterile saline    Irrigation method:  Syringe    Visualized foreign bodies/material removed: no    Skin repair:     Repair method:  Steri-Strips and tissue adhesive    Number of Steri-Strips:  1  Approximation:     Approximation:  Close  Post-procedure details:     Dressing:  Open (no dressing)    Patient tolerance of procedure: Tolerated well, no immediate complications        Diagnosis     Clinical Impression:   1. Facial laceration, initial encounter        Disposition:     Home    Follow-up Information     Follow up With Specialties Details Why Contact Info    Ebenezer Gomez MD Internal Medicine Schedule an appointment as soon as possible for a visit in 1 day  [address] [place] [address] EMERGENCY DEPT Emergency Medicine Go to  If symptoms worsen [address] [place] [phone]           There are no discharge medications for this patient. Dictation disclaimer:  Please note that this dictation was completed with File[employer], the computer voice recognition software. Quite often unanticipated grammatical, syntax, homophones, and other interpretive errors are inadvertently transcribed by the computer software. Please disregard these errors. Please excuse any errors that have escaped final proofreading.

## 2022-02-03 ENCOUNTER — HOSPITAL ENCOUNTER (EMERGENCY)
Age: 27
Discharge: HOME | End: 2022-02-03
Attending: EMERGENCY MEDICINE

## 2022-02-03 ENCOUNTER — APPOINTMENT (OUTPATIENT)
Dept: CT IMAGING | Age: 27
End: 2022-02-03
Attending: EMERGENCY MEDICINE

## 2022-02-03 VITALS
HEART RATE: 85 BPM | HEIGHT: 69 IN | BODY MASS INDEX: 22.22 KG/M2 | WEIGHT: 150 LBS | DIASTOLIC BLOOD PRESSURE: 72 MMHG | TEMPERATURE: 98.5 F | OXYGEN SATURATION: 100 % | SYSTOLIC BLOOD PRESSURE: 110 MMHG | RESPIRATION RATE: 16 BRPM

## 2022-02-03 DIAGNOSIS — S06.0X0A CONCUSSION WITHOUT LOSS OF CONSCIOUSNESS, INITIAL ENCOUNTER: Primary | ICD-10-CM

## 2022-02-03 PROCEDURE — 90471 IMMUNIZATION ADMIN: CPT

## 2022-02-03 PROCEDURE — 90715 TDAP VACCINE 7 YRS/> IM: CPT | Performed by: EMERGENCY MEDICINE

## 2022-02-03 PROCEDURE — 74011250637 HC RX REV CODE- 250/637: Performed by: EMERGENCY MEDICINE

## 2022-02-03 PROCEDURE — 74011250636 HC RX REV CODE- 250/636: Performed by: EMERGENCY MEDICINE

## 2022-02-03 PROCEDURE — 70450 CT HEAD/BRAIN W/O DYE: CPT

## 2022-02-03 PROCEDURE — 99283 EMERGENCY DEPT VISIT LOW MDM: CPT

## 2022-02-03 RX ORDER — BUTALBITAL, ACETAMINOPHEN AND CAFFEINE 300; 40; 50 MG/1; MG/1; MG/1
1 CAPSULE ORAL
Qty: 12 CAPSULE | Refills: 0 | Status: SHIPPED | OUTPATIENT
Start: 2022-02-03

## 2022-02-03 RX ORDER — ACETAMINOPHEN 500 MG
1000 TABLET ORAL
Status: COMPLETED | OUTPATIENT
Start: 2022-02-03 | End: 2022-02-03

## 2022-02-03 RX ADMIN — TETANUS TOXOID, REDUCED DIPHTHERIA TOXOID AND ACELLULAR PERTUSSIS VACCINE, ADSORBED 0.5 ML: 5; 2.5; 8; 8; 2.5 SUSPENSION INTRAMUSCULAR at 07:19

## 2022-02-03 RX ADMIN — ACETAMINOPHEN 1000 MG: 500 TABLET ORAL at 07:19

## 2022-02-03 NOTE — Clinical Note
[address] [facility] EMERGENCY DEPT  [address] [facility] [address]  [phone]    Work/School Note    Date: 2/3/2022    To Whom It May concern:      Ignacio Javier was seen and treated today in the emergency room by the following provider(s):  Attending Provider: Stacie Carey MD.      Ignacio Javier is excused from work/school on 02/03/22. He is clear to return to work/school on 02/04/22.         Sincerely,          Merissa Flores MD

## 2022-02-03 NOTE — Clinical Note
[address] [facility] EMERGENCY DEPT  [address] [facility] [address]  [phone]    Work/School Note    Date: 2/3/2022    To Whom It May concern:      Lillie Mijares was seen and treated today in the emergency room by the following provider(s):  Attending Provider: Jo Castro MD.      Lillie Mijares is excused from work/school on 02/03/22. He is clear to return to work/school on 02/04/22.         Sincerely,          Amauri Mcadams MD

## 2022-02-03 NOTE — ED TRIAGE NOTES
Alert and oriented male s/p head injury on 1/31/22. Patient struck in head by forklift. Treated for laceration and HA on that day. Since then patient reports having intermittent HA, difficulty focusing, and coordination issues.

## 2022-02-03 NOTE — ED NOTES
Assumed care of patient remains A&Ox4 resp even and unlabored, NAD noted or indicated. VSS Rn to continue to monitor and maintain safety.

## 2022-02-03 NOTE — Clinical Note
[address] [facility] EMERGENCY DEPT  [address] [facility] [address] [address] [facility] [address]  [phone]    Work/School Note    Date: 2/3/2022    To Whom It May concern:      Leno Jimenez was seen and treated today in the emergency room by the following provider(s):  Attending Provider: Martín Hickey MD.      Leno Jimenez is excused from work/school on 02/03/22. He is clear to return to work/school on 02/04/22.         Sincerely,          Chatianya Pérez MD

## 2022-02-03 NOTE — ED PROVIDER NOTES
EMERGENCY DEPARTMENT HISTORY AND PHYSICAL EXAM    6:33 AM  Date: 2/3/2022  Patient Name: Leno Jimenez    History of Presenting Illness       History Provided By:     HPI: Leno Jimenez is a 27 y.o. male with no past medical history presents after a hit on the head with a forklift on January 31. Patient was seen at this point here and had more forehead laceration repair for a steristrip. Patient did not get Tdap during previous visit. Patient complains of intermittent headaches since the injury, no modifying factors. Headaches are frontal, intermittent, moderate in severity, accompanied at times with blurry vision. Patient has difficulty concentrating and reading. Denies any weakness numbness paresthesias. PCP: Brianna Crain MD    Past History     Past Medical History:  Past Medical History:   Diagnosis Date    Asthma        Past Surgical History:  Past Surgical History:   Procedure Laterality Date    [place] LAP,INGUINAL HERNIA REPR,INITIAL         Family History:  Family History   Problem Relation Age of Onset    Diabetes Mother     Hypertension Mother     Asthma Mother     Diabetes Maternal Grandmother     Hypertension Maternal Grandmother     Hypertension Maternal Grandfather        Social History:  Social History     Tobacco Use    Smoking status: Never Smoker    Smokeless tobacco: Never Used   Substance Use Topics    Alcohol use: No    Drug use: No       Allergies:  No Known Allergies    Review of Systems   Review of Systems   Constitutional: Negative for activity change, appetite change and chills. HENT: Negative for congestion, ear discharge, ear pain and sore throat. Eyes: Negative for photophobia and pain. Respiratory: Negative for cough and choking. Cardiovascular: Negative for palpitations and leg swelling. Gastrointestinal: Negative for anal bleeding and rectal pain. Endocrine: Negative for polydipsia and polyuria.    Genitourinary: Negative for genital sores and urgency. Musculoskeletal: Negative for arthralgias and myalgias. Neurological: Positive for headaches. Negative for dizziness, seizures and speech difficulty. Psychiatric/Behavioral: Negative for hallucinations, self-injury and suicidal ideas. Physical Exam     Patient Vitals for the past 12 hrs:   Temp Pulse Resp BP SpO2   02/03/22 0623 98.5 °F (36.9 °C) 85 16 110/72 100 %       Physical Exam  Vitals and nursing note reviewed. Constitutional:       Appearance: He is well-developed. HENT:      Head: Normocephalic and atraumatic. Eyes:      General:         Right eye: No discharge. Left eye: No discharge. Cardiovascular:      Rate and Rhythm: Normal rate and regular rhythm. Heart sounds: Normal heart sounds. No murmur heard. Pulmonary:      Effort: Pulmonary effort is normal. No respiratory distress. Breath sounds: Normal breath sounds. No stridor. No wheezing or rales. Chest:      Chest wall: No tenderness. Abdominal:      General: Bowel sounds are normal. There is no distension. Palpations: Abdomen is soft. Tenderness: There is no abdominal tenderness. There is no guarding or rebound. Musculoskeletal:         General: Normal range of motion. Cervical back: Normal range of motion and neck supple. Skin:     General: Skin is warm and dry. Neurological:      General: No focal deficit present. Mental Status: He is alert and oriented to person, place, and time. Cranial Nerves: No cranial nerve deficit. Sensory: No sensory deficit. Diagnostic Study Results     Labs -  No results found for this or any previous visit (from the past 12 hour(s)). Radiologic Studies -   CT HEAD WO CONT    Result Date: 2/3/2022  No acute intracranial findings. .         Medical Decision Making     ED Course: Progress Notes, Reevaluation, and Consults:    6:33 AM Initial assessment performed.  The patients presenting problems have been discussed, and they/their family are in agreement with the care plan formulated and outlined with them. I have encouraged them to ask questions as they arise throughout their visit. Provider Notes (Medical Decision Making):   Patient presents with intermittent headaches, blurry vision, difficulty concentrating after head injury on 31st  From previous records it seems that the patient left before Tdap and was called with no success to receive Tdap  No neurological deficit on exam  We will obtain CT head today. No acute intracranial findings  Clinical impression concussion  Patient advised to follow-up with PMD and neurology if symptoms persist            Vital Signs-Reviewed the patient's vital signs. Reviewed pt's pulse ox reading. Records Reviewed: old medical records  -I am the first provider for this patient.  -I reviewed the vital signs, available nursing notes, past medical history, past surgical history, family history and social history. Clinical Impression     Clinical Impression: No diagnosis found. Disposition: This note was dictated utilizing voice recognition software which may lead to typographical errors. I apologize in advance if the situation occurs. If questions arise please do not hesitate to contact me or call our department.     Hayley Shanks MD  6:33 AM